# Patient Record
Sex: MALE | Race: WHITE | Employment: FULL TIME | URBAN - METROPOLITAN AREA
[De-identification: names, ages, dates, MRNs, and addresses within clinical notes are randomized per-mention and may not be internally consistent; named-entity substitution may affect disease eponyms.]

---

## 2024-05-23 ENCOUNTER — OFFICE VISIT (OUTPATIENT)
Dept: FAMILY MEDICINE CLINIC | Age: 44
End: 2024-05-23
Payer: COMMERCIAL

## 2024-05-23 VITALS
DIASTOLIC BLOOD PRESSURE: 74 MMHG | WEIGHT: 259.6 LBS | HEART RATE: 78 BPM | BODY MASS INDEX: 38.45 KG/M2 | HEIGHT: 69 IN | OXYGEN SATURATION: 97 % | SYSTOLIC BLOOD PRESSURE: 112 MMHG

## 2024-05-23 DIAGNOSIS — Z72.0 SMOKELESS TOBACCO USE: ICD-10-CM

## 2024-05-23 DIAGNOSIS — Z00.00 WELL ADULT EXAM: Primary | ICD-10-CM

## 2024-05-23 DIAGNOSIS — Z76.89 ENCOUNTER TO ESTABLISH CARE: ICD-10-CM

## 2024-05-23 DIAGNOSIS — E66.09 CLASS 2 OBESITY DUE TO EXCESS CALORIES WITHOUT SERIOUS COMORBIDITY WITH BODY MASS INDEX (BMI) OF 38.0 TO 38.9 IN ADULT: ICD-10-CM

## 2024-05-23 DIAGNOSIS — Z23 NEED FOR TDAP VACCINATION: ICD-10-CM

## 2024-05-23 PROBLEM — E66.812 CLASS 2 OBESITY DUE TO EXCESS CALORIES WITHOUT SERIOUS COMORBIDITY WITH BODY MASS INDEX (BMI) OF 38.0 TO 38.9 IN ADULT: Status: ACTIVE | Noted: 2024-05-23

## 2024-05-23 PROCEDURE — 90471 IMMUNIZATION ADMIN: CPT | Performed by: NURSE PRACTITIONER

## 2024-05-23 PROCEDURE — 99386 PREV VISIT NEW AGE 40-64: CPT | Performed by: NURSE PRACTITIONER

## 2024-05-23 PROCEDURE — 90715 TDAP VACCINE 7 YRS/> IM: CPT | Performed by: NURSE PRACTITIONER

## 2024-05-23 SDOH — ECONOMIC STABILITY: FOOD INSECURITY: WITHIN THE PAST 12 MONTHS, THE FOOD YOU BOUGHT JUST DIDN'T LAST AND YOU DIDN'T HAVE MONEY TO GET MORE.: NEVER TRUE

## 2024-05-23 SDOH — ECONOMIC STABILITY: HOUSING INSECURITY
IN THE LAST 12 MONTHS, WAS THERE A TIME WHEN YOU DID NOT HAVE A STEADY PLACE TO SLEEP OR SLEPT IN A SHELTER (INCLUDING NOW)?: NO

## 2024-05-23 SDOH — ECONOMIC STABILITY: FOOD INSECURITY: WITHIN THE PAST 12 MONTHS, YOU WORRIED THAT YOUR FOOD WOULD RUN OUT BEFORE YOU GOT MONEY TO BUY MORE.: NEVER TRUE

## 2024-05-23 ASSESSMENT — PATIENT HEALTH QUESTIONNAIRE - PHQ9
SUM OF ALL RESPONSES TO PHQ QUESTIONS 1-9: 0
SUM OF ALL RESPONSES TO PHQ9 QUESTIONS 1 & 2: 0
SUM OF ALL RESPONSES TO PHQ QUESTIONS 1-9: 0
1. LITTLE INTEREST OR PLEASURE IN DOING THINGS: NOT AT ALL
2. FEELING DOWN, DEPRESSED OR HOPELESS: NOT AT ALL

## 2024-05-23 NOTE — ASSESSMENT & PLAN NOTE
Uncontrolled, lifestyle modifications recommended   Lifestyle recommendations:  Weight loss, aerobic exercise, and start diet lower in saturated fats.   - Increase fresh fruits/vegetables, (baked) fish, chicken, turkey.   - Limit red meat to 1-2 times per month.   - Limit fast food to 1-2 times a month.   - Decrease carbohydrates and refined sugar.   - Start aerobic exercise (brisk walking) for 30 minutes or greater, 4-5 times per week.

## 2024-05-23 NOTE — PATIENT INSTRUCTIONS
Lab hours: Monday- Friday 7:30 am-3:30 pm  No appointment necessary, first come first serve.  Sign in at . Nothing but water for 10 hours for fasting labs. Increase water 24 hours before lab draw.

## 2024-05-23 NOTE — PROGRESS NOTES
SUBJECTIVE:   Sacha Ritchie is a 43 y.o. male presenting to establish care          History of Present Illness:    Former PCP: none since living in [place] 4 yrs ago.     Lives with: wife, daughter and son  Employment:  with [employer], [employer] job      (+) smokeless tobacco use    Patient Active Problem List   Diagnosis    Class 2 obesity due to excess calories without serious comorbidity with body mass index (BMI) of 38.0 to 38.9 in adult    Smokeless tobacco use       Past Medical History:   Diagnosis Date    Anxiety     Smokeless tobacco use 05/23/2024       Past Surgical History:   Procedure Laterality Date    WISDOM TOOTH EXTRACTION         Social History     Socioeconomic History    Marital status:      Spouse name: None    Number of children: None    Years of education: None    Highest education level: None   Tobacco Use    Smoking status: Never    Smokeless tobacco: Current     Types: Chew   Vaping Use    Vaping Use: Former    Substances: Nicotine   Substance and Sexual Activity    Alcohol use: Yes     Comment: rarely    Drug use: Never    Sexual activity: Yes     Partners: Female     Social Determinants of Health     Financial Resource Strain: Low Risk  (5/23/2024)    Overall Financial Resource Strain (CARDIA)     Difficulty of Paying Living Expenses: Not hard at all   Food Insecurity: No Food Insecurity (5/23/2024)    Hunger Vital Sign     Worried About Running Out of Food in the Last Year: Never true     Ran Out of Food in the Last Year: Never true   Transportation Needs: Unknown (5/23/2024)    PRAPARE - Transportation     Lack of Transportation (Non-Medical): No   Housing Stability: Unknown (5/23/2024)    Housing Stability Vital Sign     Unstable Housing in the Last Year: No       Family History   Problem Relation Age of Onset    COPD Mother         smoker    High Blood Pressure Mother     High Blood Pressure Brother     High Blood Pressure Brother        No current

## 2024-08-07 DIAGNOSIS — E66.09 CLASS 2 OBESITY DUE TO EXCESS CALORIES WITHOUT SERIOUS COMORBIDITY WITH BODY MASS INDEX (BMI) OF 38.0 TO 38.9 IN ADULT: ICD-10-CM

## 2024-08-07 DIAGNOSIS — Z00.00 WELL ADULT EXAM: ICD-10-CM

## 2024-08-07 LAB
25(OH)D3 SERPL-MCNC: 26.2 NG/ML
ALBUMIN SERPL-MCNC: 4.4 G/DL (ref 3.4–5)
ALBUMIN/GLOB SERPL: 1.8 {RATIO} (ref 1.1–2.2)
ALP SERPL-CCNC: 81 U/L (ref 40–129)
ALT SERPL-CCNC: 58 U/L (ref 10–40)
ANION GAP SERPL CALCULATED.3IONS-SCNC: 10 MMOL/L (ref 3–16)
AST SERPL-CCNC: 39 U/L (ref 15–37)
BASOPHILS # BLD: 0.1 K/UL (ref 0–0.2)
BASOPHILS NFR BLD: 0.9 %
BILIRUB SERPL-MCNC: 0.4 MG/DL (ref 0–1)
BUN SERPL-MCNC: 12 MG/DL (ref 7–20)
CALCIUM SERPL-MCNC: 8.8 MG/DL (ref 8.3–10.6)
CHLORIDE SERPL-SCNC: 99 MMOL/L (ref 99–110)
CHOLEST SERPL-MCNC: 266 MG/DL (ref 0–199)
CO2 SERPL-SCNC: 28 MMOL/L (ref 21–32)
CREAT SERPL-MCNC: 1 MG/DL (ref 0.9–1.3)
DEPRECATED RDW RBC AUTO: 13.7 % (ref 12.4–15.4)
EOSINOPHIL # BLD: 0.3 K/UL (ref 0–0.6)
EOSINOPHIL NFR BLD: 3.4 %
GFR SERPLBLD CREATININE-BSD FMLA CKD-EPI: >90 ML/MIN/{1.73_M2}
GLUCOSE P FAST SERPL-MCNC: 95 MG/DL (ref 70–99)
HCT VFR BLD AUTO: 44.4 % (ref 40.5–52.5)
HDLC SERPL-MCNC: 50 MG/DL (ref 40–60)
HGB BLD-MCNC: 15.1 G/DL (ref 13.5–17.5)
LDL CHOLESTEROL: 180 MG/DL
LYMPHOCYTES # BLD: 2.1 K/UL (ref 1–5.1)
LYMPHOCYTES NFR BLD: 25.8 %
MCH RBC QN AUTO: 31.2 PG (ref 26–34)
MCHC RBC AUTO-ENTMCNC: 34 G/DL (ref 31–36)
MCV RBC AUTO: 91.8 FL (ref 80–100)
MONOCYTES # BLD: 0.6 K/UL (ref 0–1.3)
MONOCYTES NFR BLD: 7.1 %
NEUTROPHILS # BLD: 5.2 K/UL (ref 1.7–7.7)
NEUTROPHILS NFR BLD: 62.8 %
PLATELET # BLD AUTO: 201 K/UL (ref 135–450)
PMV BLD AUTO: 8.9 FL (ref 5–10.5)
POTASSIUM SERPL-SCNC: 5 MMOL/L (ref 3.5–5.1)
PROT SERPL-MCNC: 6.8 G/DL (ref 6.4–8.2)
RBC # BLD AUTO: 4.84 M/UL (ref 4.2–5.9)
SODIUM SERPL-SCNC: 137 MMOL/L (ref 136–145)
TRIGL SERPL-MCNC: 178 MG/DL (ref 0–150)
TSH SERPL DL<=0.005 MIU/L-ACNC: 2.82 UIU/ML (ref 0.27–4.2)
VLDLC SERPL CALC-MCNC: 36 MG/DL
WBC # BLD AUTO: 8.3 K/UL (ref 4–11)

## 2024-08-08 ENCOUNTER — TELEMEDICINE (OUTPATIENT)
Dept: FAMILY MEDICINE CLINIC | Age: 44
End: 2024-08-08
Payer: COMMERCIAL

## 2024-08-08 DIAGNOSIS — R74.8 ELEVATED LIVER ENZYMES: ICD-10-CM

## 2024-08-08 DIAGNOSIS — E55.9 VITAMIN D DEFICIENCY: ICD-10-CM

## 2024-08-08 DIAGNOSIS — E78.00 HYPERCHOLESTEROLEMIA: Primary | ICD-10-CM

## 2024-08-08 LAB
EST. AVERAGE GLUCOSE BLD GHB EST-MCNC: 111.2 MG/DL
HBA1C MFR BLD: 5.5 %

## 2024-08-08 PROCEDURE — 99213 OFFICE O/P EST LOW 20 MIN: CPT | Performed by: NURSE PRACTITIONER

## 2024-08-08 ASSESSMENT — PATIENT HEALTH QUESTIONNAIRE - PHQ9
1. LITTLE INTEREST OR PLEASURE IN DOING THINGS: NOT AT ALL
SUM OF ALL RESPONSES TO PHQ QUESTIONS 1-9: 0
SUM OF ALL RESPONSES TO PHQ9 QUESTIONS 1 & 2: 0
SUM OF ALL RESPONSES TO PHQ QUESTIONS 1-9: 0
2. FEELING DOWN, DEPRESSED OR HOPELESS: NOT AT ALL

## 2024-08-08 ASSESSMENT — ENCOUNTER SYMPTOMS
CONSTIPATION: 0
ABDOMINAL PAIN: 0
DIARRHEA: 0
SHORTNESS OF BREATH: 0
NAUSEA: 0
BACK PAIN: 0
VOMITING: 0

## 2024-08-08 NOTE — ASSESSMENT & PLAN NOTE
Stable  The 10-year ASCVD risk score (Nahed RODRIGUEZ, et al., 2019) is: 6.6%    Values used to calculate the score:      Age: 43 years      Sex: Male      Is Non- : No      Diabetic: No      Tobacco smoker: Yes      Systolic Blood Pressure: 112 mmHg      Is BP treated: No      HDL Cholesterol: 50 mg/dL      Total Cholesterol: 266 mg/dL     Lifestyle recommendations:  Weight loss, aerobic exercise, and start diet lower in saturated fats.   - Increase fresh fruits/vegetables, (baked) fish, chicken, turkey.   - Limit red meat to 1-2 times per month.   - Limit fast food to 1-2 times a month.   - Decrease carbohydrates and refined sugar.   - Start aerobic exercise (brisk walking) for 30 minutes or greater, 4-5 times per week.

## 2024-08-08 NOTE — PROGRESS NOTES
Date of Visit:  2024    CC: Sacha Ritchie (: 1980) is a 43 y.o. male, established patient, here for evaluation/re-evaluation of the following medical concerns:    ASSESSMENT/PLAN:  Hypercholesterolemia  Assessment & Plan:    Stable  Labs from 24 reviewed in depth with pt today  The 10-year ASCVD risk score (Nahed RODRIGUEZ, et al., 2019) is: 6.6%    Values used to calculate the score:      Age: 43 years      Sex: Male      Is Non- : No      Diabetic: No      Tobacco smoker: Yes      Systolic Blood Pressure: 112 mmHg      Is BP treated: No      HDL Cholesterol: 50 mg/dL      Total Cholesterol: 266 mg/dL     Lifestyle recommendations:  Weight loss, aerobic exercise, and start diet lower in saturated fats.   - Increase fresh fruits/vegetables, (baked) fish, chicken, turkey.   - Limit red meat to 1-2 times per month.   - Limit fast food to 1-2 times a month.   - Decrease carbohydrates and refined sugar.   - Start aerobic exercise (brisk walking) for 30 minutes or greater, 4-5 times per week.      Elevated liver enzymes  I believe this is related to alcohol consumption last week  Offered repeat liver enzymes in 1 month, pt declining today.     Vitamin D deficiency  Assessment & Plan:   Stable  Pt started today OTC Vit D3 2000 iu daily      Return if symptoms worsen or fail to improve.         2024     9:17 AM   Patient-Reported Vitals   Patient-Reported Weight 259   Patient-Reported Height 5’8.5        Wt Readings from Last 3 Encounters:   24 117.8 kg (259 lb 9.6 oz)     BP Readings from Last 3 Encounters:   24 112/74     Estimated body mass index is 38.9 kg/m² as calculated from the following:    Height as of 24: 1.74 m (5' 8.5\").    Weight as of 24: 117.8 kg (259 lb 9.6 oz).    HPI  Wanting to discuss labs completed yesterday    Hyperlipidemia:  Patient is not following a low fat, low cholesterol diet.  He is not exercising regularly.  No Rx meds    (+)

## 2024-10-21 ENCOUNTER — OFFICE VISIT (OUTPATIENT)
Dept: FAMILY MEDICINE CLINIC | Age: 44
End: 2024-10-21
Payer: COMMERCIAL

## 2024-10-21 VITALS
BODY MASS INDEX: 42.07 KG/M2 | DIASTOLIC BLOOD PRESSURE: 82 MMHG | HEART RATE: 89 BPM | SYSTOLIC BLOOD PRESSURE: 124 MMHG | OXYGEN SATURATION: 96 % | WEIGHT: 280.8 LBS

## 2024-10-21 DIAGNOSIS — R60.0 PEDAL EDEMA: Primary | ICD-10-CM

## 2024-10-21 DIAGNOSIS — E66.01 CLASS 3 SEVERE OBESITY DUE TO EXCESS CALORIES WITHOUT SERIOUS COMORBIDITY WITH BODY MASS INDEX (BMI) OF 40.0 TO 44.9 IN ADULT: ICD-10-CM

## 2024-10-21 DIAGNOSIS — E66.813 CLASS 3 SEVERE OBESITY DUE TO EXCESS CALORIES WITHOUT SERIOUS COMORBIDITY WITH BODY MASS INDEX (BMI) OF 40.0 TO 44.9 IN ADULT: ICD-10-CM

## 2024-10-21 PROCEDURE — 99213 OFFICE O/P EST LOW 20 MIN: CPT | Performed by: NURSE PRACTITIONER

## 2024-10-21 ASSESSMENT — PATIENT HEALTH QUESTIONNAIRE - PHQ9
SUM OF ALL RESPONSES TO PHQ QUESTIONS 1-9: 0
2. FEELING DOWN, DEPRESSED OR HOPELESS: NOT AT ALL
SUM OF ALL RESPONSES TO PHQ9 QUESTIONS 1 & 2: 0
SUM OF ALL RESPONSES TO PHQ QUESTIONS 1-9: 0
1. LITTLE INTEREST OR PLEASURE IN DOING THINGS: NOT AT ALL

## 2024-10-21 ASSESSMENT — ENCOUNTER SYMPTOMS
CHEST TIGHTNESS: 0
SHORTNESS OF BREATH: 0

## 2024-10-21 NOTE — PROGRESS NOTES
SUBJECTIVE:  Pt is a of 44 y.o. male comes in today with   Chief Complaint   Patient presents with    Foot Swelling     Presenting today for evaluation of bilateral foot/lower leg swelling. Occurs only when traveling. 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 Swelling reduces when he is home for period of time. No redness or warmth. Left anterior ankle tender when touched  (+) recent weight gain  Wearing compression stockings has helped.   Wt Readings from Last 3 Encounters:   10/21/24 127.4 kg (280 lb 12.8 oz)   05/23/24 117.8 kg (259 lb 9.6 oz)           Prior to Visit Medications    Not on File         Patient's allergies, past medical, surgical, social and family histories werereviewed and updated as appropriate.      Review of Systems   Constitutional:  Negative for diaphoresis and fatigue.   Respiratory:  Negative for chest tightness and shortness of breath.    Cardiovascular:  Positive for leg swelling. Negative for chest pain and palpitations.   Neurological:  Negative for dizziness and light-headedness.         Physical Exam  Constitutional:       Appearance: He is well-developed. He is obese.   Cardiovascular:      Rate and Rhythm: Normal rate and regular rhythm.   Pulmonary:      Effort: Pulmonary effort is normal.      Breath sounds: Normal breath sounds.   Musculoskeletal:      Right lower leg: No edema.      Left lower leg: No edema.   Skin:     General: Skin is warm and dry.   Neurological:      Mental Status: He is alert and oriented to person, place, and time.   Psychiatric:         Mood and Affect: Mood normal.         Behavior: Behavior normal.         Thought Content: Thought content normal.         Judgment: Judgment normal.       Vitals:    10/21/24 1120   BP: 124/82   Pulse: 89   SpO2: 96%   Weight: 127.4 kg (280 lb 12.8 oz)             ASSESSMENT:  1. Pedal edema    2. Class 3 severe obesity due to excess calories without serious comorbidity with body mass index (BMI) of 40.0 to 44.9 in adult